# Patient Record
(demographics unavailable — no encounter records)

---

## 2024-10-24 NOTE — HISTORY OF PRESENT ILLNESS
[No Pertinent Cardiac History] : no history of aortic stenosis, atrial fibrillation, coronary artery disease, recent myocardial infarction, or implantable device/pacemaker [Sleep Apnea] : sleep apnea [No Adverse Anesthesia Reaction] : no adverse anesthesia reaction in self or family member [(Patient denies any chest pain, claudication, dyspnea on exertion, orthopnea, palpitations or syncope)] : Patient denies any chest pain, claudication, dyspnea on exertion, orthopnea, palpitations or syncope [____ METs%] : [unfilled] METs% [Aortic Stenosis] : no aortic stenosis [Atrial Fibrillation] : no atrial fibrillation [Coronary Artery Disease] : no coronary artery disease [Recent Myocardial Infarction] : no recent myocardial infarction [Implantable Device/Pacemaker] : no implantable device/pacemaker [Asthma] : no asthma [COPD] : no COPD [Smoker] : not a smoker [Chronic Anticoagulation] : no chronic anticoagulation [Chronic Kidney Disease] : no chronic kidney disease [Diabetes] : no diabetes [FreeTextEntry1] : Laproscopic sleeve gastrectomy [FreeTextEntry2] : 11/4/2024 [FreeTextEntry3] : Dr. Blanca [FreeTextEntry4] : 32F PMhx of STANISLAV, obesity, Vitmain D deficiency presents for NPA and pre-op clearance prior to gastric sleeve surgery. Follows with pulm for management of STANISLAV and uses a CPAP. Feels good generally, and has no specific complaints. Has headaches occasionally but uses computer for most of the day for her job. [FreeTextEntry7] : Got stress test in May 2024, no significant cardiovascular findings.

## 2024-10-24 NOTE — RESULTS/DATA
[] : results reviewed [de-identified] :  Within normal limits [de-identified] :  Within normal limits [de-identified] :  Within normal limits [de-identified] : Stress test done, normal

## 2024-10-24 NOTE — END OF VISIT
[] : Resident [FreeTextEntry3] : Labs in May normal no new PST required. Patient medically stable for planned procedure

## 2024-10-24 NOTE — ASSESSMENT
[High Risk Surgery - Intraperitoneal, Intrathoracic or Supringuinal Vascular Procedures] : High Risk Surgery - Intraperitoneal, Intrathoracic or Supringuinal Vascular Procedures - No (0) [Ischemic Heart Disease] : Ischemic Heart Disease - No (0) [Congestive Heart Failure] : Congestive Heart Failure - No (0) [Prior Cerebrovascular Accident or TIA] : Prior Cerebrovascular Accident or TIA - No (0) [Creatinine >= 2mg/dL (1 Point)] : Creatinine >= 2mg/dL - No (0) [Insulin-dependent Diabetic (1 Point)] : Insulin-dependent Diabetic - No (0) [Patient Optimized for Surgery] : Patient optimized for surgery [No Further Testing Recommended] : no further testing recommended [As per surgery] : as per surgery [FreeTextEntry4] : 32F PMHx of STANISLAV, morbid obesity, Vitamin D deficiency presents for pre op assessment - CBC, PT/INR, CMP  Within normal limits - TSH mildly elevated at 4.51, no clinical signs or symptoms of hypothyroidism, can follow up for T4 levels after surgery - ECG with normal sinus rhythm and stress test with no significant abnormalities - Pulm eval noted, elevated risk for post op pulmonary complications, should be extubated to BPAP. - RCRI lass I - Incentive spirometry post-op to reduce risk of atelectasis. - Start on appropriate DVT prophylaxis post procedure. - Encourage early ambulation to reduce risk of thromboembolic events and improve post op recovery.  - Patient is low risk for elective low risk procedure

## 2024-10-24 NOTE — PHYSICAL EXAM
[No Acute Distress] : no acute distress [Well Nourished] : well nourished [Well Developed] : well developed [Well-Appearing] : well-appearing [Normal Sclera/Conjunctiva] : normal sclera/conjunctiva [PERRL] : pupils equal round and reactive to light [EOMI] : extraocular movements intact [Normal Outer Ear/Nose] : the outer ears and nose were normal in appearance [Normal Oropharynx] : the oropharynx was normal [Normal] : the outer ears and nose were normal in appearance and the oropharynx was normal [No JVD] : no jugular venous distention [No Lymphadenopathy] : no lymphadenopathy [Supple] : supple [Thyroid Normal, No Nodules] : the thyroid was normal and there were no nodules present [No Respiratory Distress] : no respiratory distress  [No Accessory Muscle Use] : no accessory muscle use [Clear to Auscultation] : lungs were clear to auscultation bilaterally [Normal Rate] : normal rate  [Regular Rhythm] : with a regular rhythm [Normal S1, S2] : normal S1 and S2 [No Murmur] : no murmur heard [No Carotid Bruits] : no carotid bruits [No Abdominal Bruit] : a ~M bruit was not heard ~T in the abdomen [No Varicosities] : no varicosities [Pedal Pulses Present] : the pedal pulses are present [No Edema] : there was no peripheral edema [No Palpable Aorta] : no palpable aorta [No Extremity Clubbing/Cyanosis] : no extremity clubbing/cyanosis [Soft] : abdomen soft [Non Tender] : non-tender [Non-distended] : non-distended [No Masses] : no abdominal mass palpated [No HSM] : no HSM [Normal Bowel Sounds] : normal bowel sounds [Normal Posterior Cervical Nodes] : no posterior cervical lymphadenopathy [Normal Anterior Cervical Nodes] : no anterior cervical lymphadenopathy [No CVA Tenderness] : no CVA  tenderness [No Spinal Tenderness] : no spinal tenderness [No Joint Swelling] : no joint swelling [Grossly Normal Strength/Tone] : grossly normal strength/tone [No Rash] : no rash [Coordination Grossly Intact] : coordination grossly intact [No Focal Deficits] : no focal deficits [Normal Gait] : normal gait [Deep Tendon Reflexes (DTR)] : deep tendon reflexes were 2+ and symmetric [Normal Affect] : the affect was normal [Normal Insight/Judgement] : insight and judgment were intact [de-identified] : Obese

## 2024-11-19 NOTE — PHYSICAL EXAM
[Normal] : affect appropriate [de-identified] : Incisions healing appropriately without erythema or drainage, soft, NT, ND, no evidence of hernia, Steri-Strips removed

## 2024-11-19 NOTE — ASSESSMENT
[FreeTextEntry1] : 33-year-old woman 1 week s/p Laparoscopic Sleeve Gastrectomy presents for follow-up. Patient doing well. Nutrition and exercise guidelines were reviewed with the patient.   Encourage zero calorie liquids (aim for 64 oz/day) 2 protein drinks/60 g protein per day; advance to soft food at 2 weeks post op Begin vitamins after today's visit (2 in the AM, and 2 in the PM) Do not submerge incision sites until 4 weeks post op. No heavy lifting until 6 weeks post op. Start Reltone/Ursodiol and continue until 6 months post op. for gallstone prevention during this period of rapid weight loss Increase cardio to 8,000 - 10,000 steps/day, track steps, and begin strength training at 6 weeks postop Follow-up with nutritionist Follow-up in 1 month Call with any questions or concerns   All questions answered

## 2024-11-19 NOTE — HISTORY OF PRESENT ILLNESS
[de-identified] : 33-year-old woman 1 week s/p Laparoscopic Sleeve Gastrectomy presents for follow-up. Patient doing well. Reports mild incisional soreness; reports no leg cramps, fever/chills/sweats, nausea/vomiting, diarrhea, constipation, nor reflux. Patient trying to consume 2 protein shakes per day, trying to drink adequate zero-calorie liquids per day, will start bariatric vitamins after today's visit, and is ambulating for exercise.   Opioids Used (Outpatient): total oxycodone taken by POD#7: Morphine MSO4 equivalent: total at POD#7 3 MME Outpatient Pain Score at initial postop visit POD#7: pt reports pain level of 5, on pain scale of 0-10 Unused Opioids Returned: Discussed with the pt that unused opioids can be returned to the pt's local pharmacy Additional Opioids Provided: 0

## 2024-12-13 NOTE — HISTORY OF PRESENT ILLNESS
[Procedure: ___] : Procedure performed: [unfilled]  [Date of Surgery: ___] : Date of Surgery:   [unfilled] [Surgeon Name:   ___] : Surgeon Name: Dr. FELIZ [Pre-Op Weight ___] : Pre-op weight was [unfilled] lbs [Home] : at home, [unfilled] , at the time of the visit. [Medical Office: ([facility])___] : at the medical office located in  [Verbal consent obtained from patient] : the patient, [unfilled] [de-identified] : 33-year-old woman 1 month s/p Laparoscopic Sleeve Gastrectomy presents for follow-up. Patient doing well. 6 lb weight loss since last visit. Reports no abdominal pain, nausea/vomiting, constipation, diarrhea, reflux/heartburn. Patient eating 3 protein-rich meals/day, drinking adequate zero-calorie fluids/day, taking bariatric vitamins, and exercising. Pt reports did not receive Ursodiol (ordered 11/19/24), so has not been taking.   Last Nutrition appt was 12/4/24 Next Nutrition appointment is 2/6/25

## 2024-12-13 NOTE — ASSESSMENT
[FreeTextEntry1] : 33-year-old woman 1 month s/p Laparoscopic Sleeve Gastrectomy presents for follow-up. Patient doing well. 6 lb weight loss since last visit. Nutrition and exercise guidelines were reviewed with the patient.   Continue 3 protein-focused meals/day (aim for 70 g protein/day); continue soft foods until 6 weeks post op. Encourage zero calorie fluid intake (64 oz/day) Continue bariatric vitamins; can switch to capsule vitamins after today's visit (vitamin list given to pt) Exercise with cardio (aim for 8k-10k steps/day), and start strength training 2x/week at 6 weeks post op. Use step counter No heavy lifting until 6 weeks post op. Start Reltone/Ursodiol until 6 months post op. for gallstone prevention during this period of rapid weight loss Weigh yourself 1x-2x/week Follow-up with nutritionist (keep a food log) Follow-up in 2 months with labs (ordered; try to complete 1-2 weeks prior to next visit) All questions answered   Call with any questions or concerns

## 2025-02-14 NOTE — ASSESSMENT
[FreeTextEntry1] : 33-year-old woman 3 months s/p Laparoscopic Sleeve Gastrectomy presents for follow-up. Patient doing well. 11 lb weight loss since last visit 2 months ago. Nutrition and exercise guidelines were reviewed with the patient.   Continue 3 protein-focused meals/day (aim for 70 g protein/day) Encourage zero calorie fluid intake (64 oz/day) Continue bariatric vitamins Exercise with cardio (aim for 8k-10k steps/day), and start strength training at least 2x/week Use step counter Start Ursodiol for gallstone prevention during this period of rapid weight loss Weigh yourself 1x-2x/week Follow-up with nutritionist (keep a food log) Complete blood work - ordered Follow up in 3 months or sooner if needed  All questions answered   Call with any questions or concerns

## 2025-02-14 NOTE — PHYSICAL EXAM
[Normal] : affect appropriate [de-identified] : Equal chest rise, non-labored respirations, no audible wheezing. [de-identified] : soft, NT, ND, no evidence of umbilical hernia or diastasis, surgical incisions well healed

## 2025-02-14 NOTE — HISTORY OF PRESENT ILLNESS
[Procedure: ___] : Procedure performed: [unfilled]  [Date of Surgery: ___] : Date of Surgery:   [unfilled] [Surgeon Name:   ___] : Surgeon Name: Dr. FELIZ [Pre-Op Weight ___] : Pre-op weight was [unfilled] lbs [de-identified] : 33-year-old woman 3 months s/p Laparoscopic Sleeve Gastrectomy presents for follow-up. Patient doing well. 11 lb weight loss since last visit 2 months ago. Reports no abdominal pain, nausea/vomiting, constipation, diarrhea, reflux/heartburn. Patient eating 3 protein-rich meals/day, trying to drink adequate zero-calorie fluids/day, taking bariatric vitamins, and exercising by walking daily. Pt reports did not receive Ursodiol (originally ordered 11/19/24), so has not been taking. She reports it will be delivered to her house over the next couple of day.    Last Nutrition appt was 12/4/24 Next Nutrition appointment to be scheduled

## 2025-05-16 NOTE — PHYSICAL EXAM
[Normal] : affect appropriate [de-identified] : Well, healthy appearing adult [de-identified] : Eyeglasses, EOMI, no conjunctival injection, anicteric  [de-identified] : deferred as visit is TEB  [de-identified] : ANO x 3

## 2025-05-16 NOTE — ASSESSMENT
[FreeTextEntry1] : 33 year old F is 6 months s/p Laparoscopic Sleeve Gastrectomy. Feels good. Weight loss 13 lbs since last visit. Last nutrition visit 3/14/2025, maintaining portion control. Doing well overall.   Reviewed guidelines about nutrition and snacking Maintain high protein and fiber diet with 3 meals/day Maintain better food choices Continue daily Bariatric MVI caps with calcium Daily zero calorie liquid intake goal of 64 oz/day Encouraged to participate in a daily exercise regimen incorporating cardio and strength training Discussed importance of strength training, particularly metabolic conditioning 2x/wk Track steps - goal approximately 8-10k steps per day   Labs ordered to be done before next visit - ordered today, pt made aware   Return to office in 3 months Follow up with PCP for continuity of care All questions answered Call the office for any issues/concerns    Additional time spent before and after visit reviewing chart

## 2025-05-16 NOTE — HISTORY OF PRESENT ILLNESS
[Procedure: ___] : Procedure performed: [unfilled]  [Date of Surgery: ___] : Date of Surgery:   [unfilled] [Surgeon Name:   ___] : Surgeon Name: Dr. FELIZ [Pre-Op Weight ___] : Pre-op weight was [unfilled] lbs [de-identified] : 33 year old F is 6 months s/p Laparoscopic Sleeve Gastrectomy. Feels good. Weight loss 13 lbs since last visit. Last nutrition visit 3/14/2025, maintaining portion control. Consuming an adequate protein intake with 3meals/day, occasionally 1 protein shake/day. Taking bariatric MVI caps with calcium daily. Drinking adequate zero calorie liquid, 80oz/day. Ambulating daily for exercise. Sleeping could be better (busy home schedule plus work). No abdominal pain, reflux, nausea, vomiting, constipation or diarrhea.

## 2025-05-16 NOTE — REASON FOR VISIT
[FreeTextEntry1] : DEXA 8/29/17:\par LH TS -0.3 \par FN TS -2.4\par AP Spine TS 1.7 \par \par FRAX major fracture 8.4 hip fracture 1.9 \par \par XR C Spine 2/21/18:\par Degenerative changes of the cervical spine\par Translation of C3-4 and C4-5 anterolisthesis with extension and flexion. \par Translation of the C5 on C6 with flexion. \par \par US left wrist 2/23/18:\par Area of palpable abnormality corresponding to an osseous protuberance in the region of the ulna with differential considerations including osteophyte, exostosis and accessory ossicle and other etiologies. No ganglion cyst.  [Medical Office: ([facility])___] : at the medical office located in  [Telehealth (audio & video)] : This visit was provided via telehealth using real-time 2-way audio visual technology. [Verbal consent obtained from patient] : the patient, [unfilled] [Follow-Up Visit] : a follow-up visit for [S/P Bariatric Surgery] : s/p bariatric surgery [Home] : at home, [unfilled] , at the time of the visit.